# Patient Record
(demographics unavailable — no encounter records)

---

## 2024-12-30 NOTE — HISTORY OF PRESENT ILLNESS
[FreeTextEntry1] : Patient is here for follow-up visit, last seen on 7/8/24. Patient reports he has noted remarkable reduction in the frequency of migraine headaches, he has been getting about 1-2 breakthrough headaches per month, these resolve with sumatriptan 50 mg. He is also taking topiramate 50 Mg at bedtime.  Patient has not had any cluster headaches, he continues to take Emgality injection every month.  Patient brings to my attention that a week ago, he was fixing something on the roof, he slipped off with the scaffolding, fortunately sustained only external injuries to the leg, since then has been having shoulder pains and has been seeing an orthopedist

## 2024-12-30 NOTE — DATA REVIEWED
[de-identified] : 2/24/2021: Labs: CBC, LFTs normal; total cholesterol 221, HDL 46.7, .9, RF negative, , CRP< 1, ESR 10, TSH 1.08, vitamin D 30.95\par  5/8/14: EMG/NCV studies of the upper extremities: Abnormal study; electrophysiological evidence of C5-6 radiculopathy on the right side. 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 Right-sided compression of the left-sided impingement of the C6 nerve root is seen. At C4-5 right foraminal disc osteophyte complex compresses the right C5 nerve root. At C6-7 disc osteophyte complex and facet arthropathy compresses the left C7 nerve root.\par  MRI cervical spine: 12/22/2008; multilevel degenerative changes, most prominent at the C5-6 level with a central disc herniation resulting in some mild stenotic changes, over all not been a significant change when compared to exam of 1/16/07. \par  \par   \par  EMG/NCV studies of the upper extremities performed on 5/8/14: Abnormal study; electrophysiological evidence of C5-6 radiculopathy on the right side. 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 Right-sided compression of the left-sided impingement of the C6 nerve root is seen. At C4-5 right foraminal disc osteophyte complex compresses the right C5 nerve root. At C6-7 disc osteophyte complex and facet arthropathy compresses the left C7 nerve root.\par  MRI cervical spine: 12/22/2008; multilevel degenerative changes, most prominent at the C5-6 level with a central disc herniation resulting in some mild stenotic changes, over all not been a significant change when compared to exam of 1/16/07. \par  \par   \par

## 2024-12-30 NOTE — PHYSICAL EXAM
[General Appearance - In No Acute Distress] : in no acute distress [Mood] : the mood was normal [Person] : oriented to person [Place] : oriented to place [Time] : oriented to time [Short Term Intact] : short term memory intact [Remote Intact] : remote memory intact [Registration Intact] : recent registration memory intact [Span Intact] : the attention span was normal [Concentration Intact] : normal concentrating ability [Naming Objects] : no difficulty naming common objects [Repeating Phrases] : no difficulty repeating a phrase [Fluency] : fluency intact [Comprehension] : comprehension intact [Current Events] : adequate knowledge of current events [Past History] : adequate knowledge of personal past history [Cranial Nerves Oculomotor (III)] : extraocular motion intact [Cranial Nerves Trigeminal (V)] : facial sensation intact symmetrically [Cranial Nerves Facial (VII)] : face symmetrical [Cranial Nerves Vestibulocochlear (VIII)] : hearing was intact bilaterally [Cranial Nerves Glossopharyngeal (IX)] : tongue and palate midline [Cranial Nerves Accessory (XI - Cranial And Spinal)] : head turning and shoulder shrug symmetric [Cranial Nerves Hypoglossal (XII)] : there was no tongue deviation with protrusion [Motor Tone] : muscle tone was normal in all four extremities [Motor Strength] : muscle strength was normal in all four extremities [No Muscle Atrophy] : normal bulk in all four extremities [Sensation Tactile Decrease] : light touch was intact [Abnormal Walk] : normal gait [Balance] : balance was intact [1+] : Brachioradialis right 1+ [2+] : Ankle jerk left 2+ [Outer Ear] : the ears and nose were normal in appearance [Hearing Threshold Finger Rub Not [place]] : hearing was normal [] : the neck was supple [Past-pointing] : there was no past-pointing [Tremor] : no tremor present [Plantar Reflex Right Only] : normal on the right [Plantar Reflex Left Only] : normal on the left [FreeTextEntry5] : left keratopathy, right eye vision and pupil normal. [FreeTextEntry8] : can walk on heels, toes and tandem, can stand on each leg. [FreeTextEntry1] : pain in both shoulders upon elevation

## 2024-12-30 NOTE — DISCUSSION/SUMMARY
[FreeTextEntry1] : 65 year-old man with history of chronic migraine headaches and C-spine DJD   # Intractable migraine X  without aura; remarkable reduction, currently 1-2 headaches per month  #  Acute intractable cluster headaches, initial cluster started in 8/2022 remarkable response with Emgality 100 Mg monthly injection  -Sumatriptan 50 Mg at the onset of severe headache, may repeat dose in 2-3 hours if needed. Elitriptan discontinued -Emgality 300 Mg SC x monthly -Continue to maintain headache diary, follow-up in 6 months  # Neck strain / Right cervical radiculopathy  -Follow-up with pain management

## 2024-12-30 NOTE — REASON FOR VISIT
[Follow-Up: _____] : a [unfilled] follow-up visit [FreeTextEntry1] : For cluster / migraine headache / s-p fall from ladder

## 2024-12-30 NOTE — REVIEW OF SYSTEMS
[Migraine Headache] : migraine headaches [As Noted in HPI] : as noted in HPI [Negative] : Heme/Lymph [Cluster Headache] : no cluster headache [FreeTextEntry9] : bilateral shoulder pain

## 2025-07-08 NOTE — PHYSICAL EXAM
[General Appearance - In No Acute Distress] : in no acute distress [Mood] : the mood was normal [Person] : oriented to person [Place] : oriented to place [Time] : oriented to time [Short Term Intact] : short term memory intact [Remote Intact] : remote memory intact [Registration Intact] : recent registration memory intact [Span Intact] : the attention span was normal [Concentration Intact] : normal concentrating ability [Naming Objects] : no difficulty naming common objects [Repeating Phrases] : no difficulty repeating a phrase [Fluency] : fluency intact [Comprehension] : comprehension intact [Current Events] : adequate knowledge of current events [Cranial Nerves Oculomotor (III)] : extraocular motion intact [Cranial Nerves Trigeminal (V)] : facial sensation intact symmetrically [Cranial Nerves Facial (VII)] : face symmetrical [Cranial Nerves Vestibulocochlear (VIII)] : hearing was intact bilaterally [Cranial Nerves Glossopharyngeal (IX)] : tongue and palate midline [Cranial Nerves Accessory (XI - Cranial And Spinal)] : head turning and shoulder shrug symmetric [Cranial Nerves Hypoglossal (XII)] : there was no tongue deviation with protrusion [Motor Tone] : muscle tone was normal in all four extremities [Motor Strength] : muscle strength was normal in all four extremities [No Muscle Atrophy] : normal bulk in all four extremities [Sensation Tactile Decrease] : light touch was intact [Abnormal Walk] : normal gait [Balance] : balance was intact [1+] : Brachioradialis right 1+ [2+] : Ankle jerk left 2+ [Outer Ear] : the ears and nose were normal in appearance [Hearing Threshold Finger Rub Not [place]] : hearing was normal [] : the neck was supple [Past-pointing] : there was no past-pointing [Tremor] : no tremor present [Plantar Reflex Right Only] : normal on the right [Plantar Reflex Left Only] : normal on the left [FreeTextEntry5] : left keratopathy, right eye vision and pupil normal. [FreeTextEntry8] : can walk on heels, toes and tandem, can stand on each leg. [FreeTextEntry1] : pain in both shoulders upon elevation

## 2025-07-08 NOTE — PHYSICAL EXAM
[General Appearance - In No Acute Distress] : in no acute distress [Mood] : the mood was normal [Person] : oriented to person [Place] : oriented to place [Time] : oriented to time [Short Term Intact] : short term memory intact [Remote Intact] : remote memory intact [Registration Intact] : recent registration memory intact [Span Intact] : the attention span was normal [Concentration Intact] : normal concentrating ability [Naming Objects] : no difficulty naming common objects [Repeating Phrases] : no difficulty repeating a phrase [Fluency] : fluency intact [Comprehension] : comprehension intact [Current Events] : adequate knowledge of current events [Cranial Nerves Oculomotor (III)] : extraocular motion intact [Cranial Nerves Trigeminal (V)] : facial sensation intact symmetrically [Cranial Nerves Facial (VII)] : face symmetrical [Cranial Nerves Vestibulocochlear (VIII)] : hearing was intact bilaterally [Cranial Nerves Glossopharyngeal (IX)] : tongue and palate midline [Cranial Nerves Accessory (XI - Cranial And Spinal)] : head turning and shoulder shrug symmetric [Cranial Nerves Hypoglossal (XII)] : there was no tongue deviation with protrusion [Motor Tone] : muscle tone was normal in all four extremities [Motor Strength] : muscle strength was normal in all four extremities [No Muscle Atrophy] : normal bulk in all four extremities [Sensation Tactile Decrease] : light touch was intact [Abnormal Walk] : normal gait [Balance] : balance was intact [1+] : Brachioradialis right 1+ [2+] : Ankle jerk left 2+ [Outer Ear] : the ears and nose were normal in appearance [Hearing Threshold Finger Rub Not [place]] : hearing was normal [] : the neck was supple [Past-pointing] : there was no past-pointing [Tremor] : no tremor present [Plantar Reflex Right Only] : normal on the right [Plantar Reflex Left Only] : normal on the left [FreeTextEntry5] : left keratopathy, right eye vision and pupil normal. [FreeTextEntry8] : can walk on heels, toes and tandem, can stand on each leg. [FreeTextEntry1] : pain in both shoulders upon elevation

## 2025-07-08 NOTE — DISCUSSION/SUMMARY
[FreeTextEntry1] : 65 year-old man with history of chronic migraine headaches and C-spine DJD   # Intractable migraine X  without aura; remarkable reduction, currently < 1 headaches per month  #  Acute intractable cluster headaches, initial cluster started in 8/2022 remarkable response with Emgality 100 Mg monthly injection  -Sumatriptan 50 Mg at the onset of severe headache, may repeat dose in 2-3 hours if needed. Elitriptan discontinued -Emgality 300 Mg SC x monthly -Continue to maintain headache diary, follow-up in 6 months  #Right lower abdominal pain, history of prior renal stones  - I have recommended lowering topiramate to 25 Mg daily for 1 month, after that 25 Mg every other day - Also recommended patient follow-up with his PMD and if needed urologist to rule out recurrence of renal stones

## 2025-07-08 NOTE — HISTORY OF PRESENT ILLNESS
[FreeTextEntry1] : Patient is here for follow-up visit, last seen on 12/30/24 . Patient reports he has noted remarkable reduction in the frequency of migraine headaches, he has been getting < 1 headaches per month, these resolve with sumatriptan 50 mg. He is also taking topiramate 50 Mg at bedtime.  Patient has not had any cluster headaches, he continues to take Emgality injection every month.  Patient brings to my attention that occasionally he has pain in the right lower abdomen in the mornings, it improves after he voids, he has history of renal stones for which he has undergone lithotripsy.  He has not had any hematuria or dysuria or severe renal colicky pain

## 2025-07-08 NOTE — DATA REVIEWED
[de-identified] : 2/24/2021: Labs: CBC, LFTs normal; total cholesterol 221, HDL 46.7, .9, RF negative, , CRP< 1, ESR 10, TSH 1.08, vitamin D 30.95\par  5/8/14: EMG/NCV studies of the upper extremities: Abnormal study; electrophysiological evidence of C5-6 radiculopathy on the right side. 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 Right-sided compression of the left-sided impingement of the C6 nerve root is seen. At C4-5 right foraminal disc osteophyte complex compresses the right C5 nerve root. At C6-7 disc osteophyte complex and facet arthropathy compresses the left C7 nerve root.\par  MRI cervical spine: 12/22/2008; multilevel degenerative changes, most prominent at the C5-6 level with a central disc herniation resulting in some mild stenotic changes, over all not been a significant change when compared to exam of 1/16/07. \par  \par   \par  EMG/NCV studies of the upper extremities performed on 5/8/14: Abnormal study; electrophysiological evidence of C5-6 radiculopathy on the right side. 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 Right-sided compression of the left-sided impingement of the C6 nerve root is seen. At C4-5 right foraminal disc osteophyte complex compresses the right C5 nerve root. At C6-7 disc osteophyte complex and facet arthropathy compresses the left C7 nerve root.\par  MRI cervical spine: 12/22/2008; multilevel degenerative changes, most prominent at the C5-6 level with a central disc herniation resulting in some mild stenotic changes, over all not been a significant change when compared to exam of 1/16/07. \par  \par   \par

## 2025-07-08 NOTE — REVIEW OF SYSTEMS
[Migraine Headache] : migraine headaches [Cluster Headache] : no cluster headache [As Noted in HPI] : as noted in HPI [Negative] : Musculoskeletal

## 2025-07-08 NOTE — DATA REVIEWED
[de-identified] : 2/24/2021: Labs: CBC, LFTs normal; total cholesterol 221, HDL 46.7, .9, RF negative, , CRP< 1, ESR 10, TSH 1.08, vitamin D 30.95\par  5/8/14: EMG/NCV studies of the upper extremities: Abnormal study; electrophysiological evidence of C5-6 radiculopathy on the right side. 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 Right-sided compression of the left-sided impingement of the C6 nerve root is seen. At C4-5 right foraminal disc osteophyte complex compresses the right C5 nerve root. At C6-7 disc osteophyte complex and facet arthropathy compresses the left C7 nerve root.\par  MRI cervical spine: 12/22/2008; multilevel degenerative changes, most prominent at the C5-6 level with a central disc herniation resulting in some mild stenotic changes, over all not been a significant change when compared to exam of 1/16/07. \par  \par   \par  EMG/NCV studies of the upper extremities performed on 5/8/14: Abnormal study; electrophysiological evidence of C5-6 radiculopathy on the right side. 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 Right-sided compression of the left-sided impingement of the C6 nerve root is seen. At C4-5 right foraminal disc osteophyte complex compresses the right C5 nerve root. At C6-7 disc osteophyte complex and facet arthropathy compresses the left C7 nerve root.\par  MRI cervical spine: 12/22/2008; multilevel degenerative changes, most prominent at the C5-6 level with a central disc herniation resulting in some mild stenotic changes, over all not been a significant change when compared to exam of 1/16/07. \par  \par   \par